# Patient Record
Sex: MALE | Race: ASIAN | NOT HISPANIC OR LATINO | Employment: UNEMPLOYED | URBAN - METROPOLITAN AREA
[De-identification: names, ages, dates, MRNs, and addresses within clinical notes are randomized per-mention and may not be internally consistent; named-entity substitution may affect disease eponyms.]

---

## 2017-01-04 ENCOUNTER — GENERIC CONVERSION - ENCOUNTER (OUTPATIENT)
Dept: OTHER | Facility: OTHER | Age: 47
End: 2017-01-04

## 2017-01-04 ENCOUNTER — TRANSCRIBE ORDERS (OUTPATIENT)
Dept: ADMINISTRATIVE | Facility: HOSPITAL | Age: 47
End: 2017-01-04

## 2017-01-04 DIAGNOSIS — M54.5 LOW BACK PAIN, UNSPECIFIED BACK PAIN LATERALITY, UNSPECIFIED CHRONICITY, WITH SCIATICA PRESENCE UNSPECIFIED: ICD-10-CM

## 2017-01-04 DIAGNOSIS — M54.16 LUMBAR RADICULOPATHY: Primary | ICD-10-CM

## 2017-01-04 DIAGNOSIS — G89.4 CHRONIC PAIN SYNDROME: ICD-10-CM

## 2017-01-04 DIAGNOSIS — M96.1 POSTLAMINECTOMY SYNDROME, UNSPECIFIED REGION: ICD-10-CM

## 2017-01-10 ENCOUNTER — HOSPITAL ENCOUNTER (OUTPATIENT)
Dept: RADIOLOGY | Facility: HOSPITAL | Age: 47
Discharge: HOME | End: 2017-01-10
Attending: ANESTHESIOLOGY
Payer: COMMERCIAL

## 2017-01-10 DIAGNOSIS — M54.5 LOW BACK PAIN, UNSPECIFIED BACK PAIN LATERALITY, UNSPECIFIED CHRONICITY, WITH SCIATICA PRESENCE UNSPECIFIED: ICD-10-CM

## 2017-01-10 DIAGNOSIS — G89.4 CHRONIC PAIN SYNDROME: ICD-10-CM

## 2017-01-10 DIAGNOSIS — M96.1 POSTLAMINECTOMY SYNDROME, UNSPECIFIED REGION: ICD-10-CM

## 2017-01-10 DIAGNOSIS — M54.16 LUMBAR RADICULOPATHY: ICD-10-CM

## 2017-01-10 PROCEDURE — A9585 GADOBUTROL INJECTION: HCPCS | Performed by: ANESTHESIOLOGY

## 2017-01-10 PROCEDURE — 72158 MRI LUMBAR SPINE W/O & W/DYE: CPT

## 2017-01-10 RX ADMIN — GADOBUTROL 13 ML: 604.72 INJECTION INTRAVENOUS at 14:47

## 2017-01-18 ENCOUNTER — GENERIC CONVERSION - ENCOUNTER (OUTPATIENT)
Dept: OTHER | Facility: OTHER | Age: 47
End: 2017-01-18

## 2017-01-30 ENCOUNTER — ALLSCRIPTS OFFICE VISIT (OUTPATIENT)
Dept: OTHER | Facility: OTHER | Age: 47
End: 2017-01-30

## 2017-02-16 ENCOUNTER — HOSPITAL ENCOUNTER (OUTPATIENT)
Facility: AMBULARY SURGERY CENTER | Age: 47
Setting detail: OUTPATIENT SURGERY
Discharge: HOME | End: 2017-02-16
Attending: ANESTHESIOLOGY | Admitting: ANESTHESIOLOGY
Payer: COMMERCIAL

## 2017-02-16 ENCOUNTER — HOSPITAL ENCOUNTER (OUTPATIENT)
Dept: RADIOLOGY | Facility: HOSPITAL | Age: 47
Setting detail: OUTPATIENT SURGERY
Discharge: HOME | End: 2017-02-16
Payer: COMMERCIAL

## 2017-02-16 VITALS
HEART RATE: 65 BPM | DIASTOLIC BLOOD PRESSURE: 77 MMHG | HEIGHT: 75 IN | BODY MASS INDEX: 35.56 KG/M2 | TEMPERATURE: 98.2 F | WEIGHT: 286 LBS | OXYGEN SATURATION: 97 % | SYSTOLIC BLOOD PRESSURE: 159 MMHG | RESPIRATION RATE: 18 BRPM

## 2017-02-16 PROBLEM — M54.50 LOW BACK PAIN: Status: ACTIVE | Noted: 2017-02-16

## 2017-02-16 PROBLEM — M96.1 POSTLAMINECTOMY SYNDROME, NOT ELSEWHERE CLASSIFIED: Status: ACTIVE | Noted: 2017-02-16

## 2017-02-16 PROBLEM — G89.4 CHRONIC PAIN SYNDROME: Status: ACTIVE | Noted: 2017-02-16

## 2017-02-16 PROBLEM — M54.16 RADICULOPATHY OF LUMBAR REGION: Status: ACTIVE | Noted: 2017-02-16

## 2017-02-16 PROCEDURE — 72220 X-RAY EXAM SACRUM TAILBONE: CPT

## 2017-02-16 RX ORDER — TOPIRAMATE 100 MG/1
100 TABLET, FILM COATED ORAL 2 TIMES DAILY
COMMUNITY

## 2017-02-16 RX ORDER — METHYLPREDNISOLONE ACETATE 80 MG/ML
INJECTION, SUSPENSION INTRA-ARTICULAR; INTRALESIONAL; INTRAMUSCULAR; SOFT TISSUE AS NEEDED
Status: DISCONTINUED | OUTPATIENT
Start: 2017-02-16 | End: 2017-02-16 | Stop reason: HOSPADM

## 2017-02-16 RX ORDER — BUTALBITAL/ASPIRIN/CAFFEINE 50-325-40
1 CAPSULE ORAL EVERY 4 HOURS PRN
COMMUNITY

## 2017-02-16 RX ORDER — AMITRIPTYLINE HYDROCHLORIDE 25 MG/1
25 TABLET, FILM COATED ORAL
COMMUNITY

## 2017-02-16 RX ORDER — TRAMADOL HYDROCHLORIDE 50 MG/1
50 TABLET ORAL EVERY 6 HOURS PRN
COMMUNITY

## 2017-02-16 RX ORDER — LIDOCAINE HYDROCHLORIDE 10 MG/ML
INJECTION, SOLUTION EPIDURAL; INFILTRATION; INTRACAUDAL; PERINEURAL AS NEEDED
Status: DISCONTINUED | OUTPATIENT
Start: 2017-02-16 | End: 2017-02-16 | Stop reason: HOSPADM

## 2017-03-03 ENCOUNTER — GENERIC CONVERSION - ENCOUNTER (OUTPATIENT)
Dept: OTHER | Facility: OTHER | Age: 47
End: 2017-03-03

## 2017-03-16 ENCOUNTER — ALLSCRIPTS OFFICE VISIT (OUTPATIENT)
Dept: OTHER | Facility: OTHER | Age: 47
End: 2017-03-16

## 2017-04-05 ENCOUNTER — TRANSCRIBE ORDERS (OUTPATIENT)
Dept: ADMINISTRATIVE | Facility: HOSPITAL | Age: 47
End: 2017-04-05

## 2017-04-05 DIAGNOSIS — M54.16 LUMBAR RADICULOPATHY: ICD-10-CM

## 2017-04-05 DIAGNOSIS — M54.5 LOW BACK PAIN, UNSPECIFIED BACK PAIN LATERALITY, UNSPECIFIED CHRONICITY, WITH SCIATICA PRESENCE UNSPECIFIED: ICD-10-CM

## 2017-04-05 DIAGNOSIS — M96.1 POSTLAMINECTOMY SYNDROME, THORACIC REGION: ICD-10-CM

## 2017-04-05 DIAGNOSIS — G89.4 CHRONIC PAIN SYNDROME: Primary | ICD-10-CM

## 2017-04-06 ENCOUNTER — HOSPITAL ENCOUNTER (OUTPATIENT)
Dept: RADIOLOGY | Facility: HOSPITAL | Age: 47
Discharge: HOME | End: 2017-04-06
Attending: ANESTHESIOLOGY
Payer: COMMERCIAL

## 2017-04-06 DIAGNOSIS — M54.16 LUMBAR RADICULOPATHY: ICD-10-CM

## 2017-04-06 DIAGNOSIS — M54.5 LOW BACK PAIN, UNSPECIFIED BACK PAIN LATERALITY, UNSPECIFIED CHRONICITY, WITH SCIATICA PRESENCE UNSPECIFIED: ICD-10-CM

## 2017-04-06 DIAGNOSIS — G89.4 CHRONIC PAIN SYNDROME: ICD-10-CM

## 2017-04-06 DIAGNOSIS — M96.1 POSTLAMINECTOMY SYNDROME, THORACIC REGION: ICD-10-CM

## 2017-04-06 PROCEDURE — 72146 MRI CHEST SPINE W/O DYE: CPT

## 2017-04-13 ENCOUNTER — ALLSCRIPTS OFFICE VISIT (OUTPATIENT)
Dept: OTHER | Facility: OTHER | Age: 47
End: 2017-04-13

## 2017-10-12 ENCOUNTER — GENERIC CONVERSION - ENCOUNTER (OUTPATIENT)
Dept: OTHER | Facility: OTHER | Age: 47
End: 2017-10-12

## 2018-01-10 NOTE — MISCELLANEOUS
Message   Recorded as Task   Date: 12/22/2016 10:57 AM, Created By: Marysol Hare   Task Name: Call Back   Assigned To: [address] [facility] Procedure,Team   Regarding Patient: Jessica Daniels, Status: Active   Comment:    Shavonne Bowen - 22 Dec 2016 10:57 AM     TASK CREATED  Caller: Self; Authorization Status; [phone] (Home)  Received a vmlom from 1018 today from the pt  stating his MRI was not approved because his insurance company needs supporting documentation  Could you please look into this for him? Thanks   Talib Ocampo - 30 Dec 2016 10:11 AM     TASK REASSIGNED: Previously Assigned To [facility] surgery sched,Team        Active Problems    1  Chronic pain syndrome (338 4) (G89 4)   2  Continuous opioid dependence (304 01) (F11 20)   3  Long-term current use of opiate analgesic (V58 69) (Z79 891)   4  Lumbago (724 2) (M54 5)   5  Lumbar postlaminectomy syndrome (722 83) (M96 1)   6  Lumbar radiculopathy (724 4) (M54 16)    Current Meds   1  Amitriptyline HCl - 25 MG Oral Tablet; Therapy: (Recorded:77Wdh9900) to Recorded   2  Butalbital-APAP-Caffeine -40 MG Oral Tablet; Therapy: (Recorded:01Juh4146) to Recorded   3  MetFORMIN HCl TABS; Therapy: (Recorded:92Yii9430) to Recorded   4  Topiramate 100 MG Oral Tablet; TAKE 1 TABLET TWICE DAILY; Therapy: 06ZCQ5635 to (Evaluate:28Jan2017)  Requested for: 08Ktc9274; Last   Rx:50Twl6715 Ordered   5  TraMADol HCl - 50 MG Oral Tablet; Take 1-2 tablets PO TID prn pain; Therapy: 84RTV2731 to (Evaluate:82Fvl1905); Last Rx:40Fkc1361 Ordered   6  Vitamin D TABS; Therapy: (Recorded:12Faq0649) to Recorded    Allergies    1   No Known Drug Allergies    Signatures   Electronically signed by : Tejinder Mccarthy MA; Jan 4 2017  3:01PM EST                       (Author)

## 2018-01-10 NOTE — RESULT NOTES
Message   Recorded as Task   Date: 02/23/2017 01:49 PM, Created By: Mary Davis   Task Name: Follow Up   Assigned To: [address] [facility], [address] Procedure,Team   Regarding Patient: Lisa Farris, Status: In Progress   Comment:    Violet England - 23 Feb 2017 1:49 PM     TASK CREATED  L/m to return call  S/p CAUDAL GRACE done 2/16/17 by Violte Amador - 28 Feb 2017 10:12 AM     TASK EDITED  2nd attempt  l/m to return call  S/p CAUDAL GRACE done 2/136/17 by Ginette Arce - 56 Mar 6364 22:23 AM     TASK EDITED  2 attempt made  Pt has f/u 3/16 will f/u then          Signatures   Electronically signed by : Victoria Sethi, ; Mar  3 2017 10:02AM EST                       (Author)

## 2018-01-11 NOTE — MISCELLANEOUS
Message   Recorded as Task   Date: 01/04/2017 12:51 PM, Created By: Zaid Porras   Task Name: Care Coordination   Assigned To: Violet England   Regarding Patient: Jermaine Posey, Status: Active   Comment:    Violet England - 04 Jan 2017 12:51 PM     TASK CREATED  Patient MRI was denied  Please call [phone] option# 3 to do a peer to peer  [address] - 04 Jan 2017 1:00 PM     TASK REPLIED TO: Previously Assigned To [address]  I need the reference number and patient ID number in order to do a peer to peer  [address] - 04 Jan 2017 2:45 PM     TASK REASSIGNED: Previously Assigned To Violet England  MRI approved - [id number]   Violet England - 04 Jan 2017 2:58 PM     TASK EDITED  Patient was notified that his MRI was approved and the number for scheduling was provided  The MRI order was faxed with the auth# on it to the scheduling dept  Active Problems    1  Chronic pain syndrome (338 4) (G89 4)   2  Continuous opioid dependence (304 01) (F11 20)   3  Long-term current use of opiate analgesic (V58 69) (Z79 891)   4  Lumbago (724 2) (M54 5)   5  Lumbar postlaminectomy syndrome (722 83) (M96 1)   6  Lumbar radiculopathy (724 4) (M54 16)    Current Meds   1  Amitriptyline HCl - 25 MG Oral Tablet; Therapy: (Recorded:43Klj3091) to Recorded   2  Butalbital-APAP-Caffeine -40 MG Oral Tablet; Therapy: (Recorded:13Wlt0901) to Recorded   3  MetFORMIN HCl TABS; Therapy: (Recorded:53Wev8021) to Recorded   4  Topiramate 100 MG Oral Tablet; TAKE 1 TABLET TWICE DAILY; Therapy: 92RVD4711 to (Evaluate:28Jan2017)  Requested for: 29Dec2016; Last   Rx:50Nmz3341 Ordered   5  TraMADol HCl - 50 MG Oral Tablet; Take 1-2 tablets PO TID prn pain; Therapy: 21SRA6046 to (Evaluate:08Qyy8764); Last Rx:18Sgz9363 Ordered   6  Vitamin D TABS; Therapy: (Recorded:99Sry2542) to Recorded    Allergies    1   No Known Drug Allergies    Signatures   Electronically signed by : Elvia Loza MA; Jan 4 2017  2:58PM EST (Author)

## 2018-01-13 VITALS
WEIGHT: 287 LBS | DIASTOLIC BLOOD PRESSURE: 74 MMHG | HEART RATE: 76 BPM | TEMPERATURE: 98.7 F | OXYGEN SATURATION: 97 % | SYSTOLIC BLOOD PRESSURE: 118 MMHG | HEIGHT: 75 IN | BODY MASS INDEX: 35.68 KG/M2

## 2018-01-14 VITALS
SYSTOLIC BLOOD PRESSURE: 130 MMHG | DIASTOLIC BLOOD PRESSURE: 80 MMHG | WEIGHT: 293 LBS | OXYGEN SATURATION: 97 % | HEIGHT: 75 IN | HEART RATE: 76 BPM | BODY MASS INDEX: 36.43 KG/M2

## 2018-01-15 NOTE — MISCELLANEOUS
Message   Recorded as Task   Date: 01/18/2017 01:11 PM, Created By: Shi Perez   Task Name: Follow Up   Assigned To: [address] [facility] [address] [facility] Clinical,Team   Regarding Patient: Chuyita Stewart, Status: Active   Comment:    Humaira Arce - 18 Jan 2017 1:11 PM     TASK CREATED  Caller: Self; General Medical Question; [phone] (Home)  Pt lmom inquiring if he could be put to sleep for injection on 2/2/17 because he knows it hurts bad and he is afraid of pain & needles  Returned pt's call and advised him that the doctor does not perform caudal charlee under general anesthesia and uses a local anesthetic  Pt verbalized understanding  Humaira Arce - 18 Jan 2017 1:11 PM     TASK EDITED   Bhavna Walker - 18 Jan 2017 1:52 PM     TASK REPLIED TO: Previously Assigned To [place]  Thanks  Active Problems    1  Chronic pain syndrome (338 4) (G89 4)   2  Continuous opioid dependence (304 01) (F11 20)   3  Long-term current use of opiate analgesic (V58 69) (Z79 891)   4  Lumbago (724 2) (M54 5)   5  Lumbar postlaminectomy syndrome (722 83) (M96 1)   6  Lumbar radiculopathy (724 4) (M54 16)    Current Meds   1  Amitriptyline HCl - 25 MG Oral Tablet; Therapy: (Recorded:63Lvy0447) to Recorded   2  Butalbital-APAP-Caffeine -40 MG Oral Tablet; Therapy: (Recorded:15Xfz2082) to Recorded   3  MetFORMIN HCl TABS; Therapy: (Recorded:78Coz3997) to Recorded   4  Topiramate 100 MG Oral Tablet; TAKE 1 TABLET TWICE DAILY; Therapy: 65VTL9765 to (Evaluate:28Jan2017)  Requested for: 47Iyd0809; Last   Rx:55Dbs1327 Ordered   5  TraMADol HCl - 50 MG Oral Tablet; Take 1-2 tablets PO TID prn pain; Therapy: 28LGH8781 to (Evaluate:95Ukt6714); Last Rx:73Veh1834 Ordered   6  Vitamin D TABS; Therapy: (Recorded:28Sjt9702) to Recorded    Allergies    1   No Known Drug Allergies    Signatures   Electronically signed by : Familia Huang RN; Jan 18 2017  1:55PM EST                       (Author)

## 2018-01-15 NOTE — MISCELLANEOUS
Message   Recorded as Task   Date: 01/11/2017 03:21 PM, Created By: [address]   Task Name: Care Coordination   Assigned To: [facility] stim,Team   Regarding Patient: Lisa Farris, Status: In Progress   Comment:    DennisTinoaz - 11 Jan 2017 3:21 PM     TASK CREATED  Discussed MRI results with patient  Also discussed SCS trial with [employer] technology  The patient is interested in learning more about the technology, so please mail him an informational packet from [address] [place] [address] as well as [facility] Martin - 11 Jan 2017 3:32 PM     TASK EDITED  Spoke with pt and informed him I will mail out [employer], pt is scheduled for education on 2/3/17 @ 1500 at the [place] office  Email sent to Nelia from [facility]  If pt is then still interested we will move fwd with psych eval    Loren Quiros - 22 Mar 2017 3:48 PM     TASK EDITED  Pt called regarding psych eval  Pls contact pt at [phone]  Cami Cadena - 23 Mar 2017 8:55 AM     TASK EDITED  Attempt to reach pt  LM informing him that I have not received his psych eval yet, left my number for him to return my call  Cami Cadena - 05 Apr 2017 9:38 AM     TASK EDITED  Spoke with pt and gave him the phone number for the [facility] in clinical services for the psych eval ([phone]) and also the number for [facility]'s central scheduling for the MRI  Cami Cadena - 18 Apr 2017 10:56 AM     TASK EDITED  Pt comeplted education   Kenzie Louis - 12 Oct 2017 11:43 AM     TASK EDITED  Spoke with pt and he is dealing with kidney issues but should things change and he would like to move forward with the SCS trial he will call  Active Problems    1  Chronic pain syndrome (338 4) (G89 4)   2  Continuous opioid dependence (304 01) (F11 20)   3  Long-term current use of opiate analgesic (V58 69) (Z79 891)   4  Lumbago (724 2) (M54 5)   5  Lumbar postlaminectomy syndrome (722 83) (M96 1)   6   Lumbar radiculopathy (724 4) (M54 16)    Current Meds   1  Amitriptyline HCl - 25 MG Oral Tablet; Therapy: (Recorded:45Xxl9001) to Recorded   2  Butalbital-APAP-Caffeine -40 MG Oral Tablet; Therapy: (Recorded:03Llc7808) to Recorded   3  MetFORMIN HCl TABS; Therapy: (Recorded:56Mth2564) to Recorded   4  Topiramate 100 MG Oral Tablet; TAKE 1 TABLET TWICE DAILY; Therapy: 20IGG8502 to (Evaluate:15Apr2017)  Requested for: 29XFQ3825; Last   Rx:16Mar2017 Ordered   5  TraMADol HCl - 50 MG Oral Tablet; Take 1-2 tablets PO TID prn pain; Therapy: 43ZKL2973 to (Evaluate:05Mar2017); Last Rx:30Jan2017 Ordered   6  Vitamin D TABS; Therapy: (Recorded:88Tfl1720) to Recorded    Allergies    1   No Known Drug Allergies    Signatures   Electronically signed by : Diana Mast, ; Oct 12 2017 11:44AM EST                       (Author)

## 2018-01-15 NOTE — MISCELLANEOUS
Signatures   Electronically signed by : JENNIE Erickson ; Apr 13 2017 11:08AM EST                       (Author)

## (undated) DEVICE — SYRINGE 5ML LL

## (undated) DEVICE — NEEDLE SPINAL 22G X 3.5IN  QUINCKE

## (undated) DEVICE — SMALL NEEDLE COUNTER NEST

## (undated) DEVICE — UNIVERSAL BLOCK TRAY: Brand: INTEGRA®

## (undated) DEVICE — RADIOLOGY STERILE LABELS: Brand: CENTURION

## (undated) DEVICE — CHLORAPREP HI-LITE 10.5ML ORANGE

## (undated) DEVICE — NEEDLE BLUNT 18 G X 1 1/2 W FILTER

## (undated) DEVICE — WIPES BABY PAMPERS SENSITIVE 36/PK

## (undated) DEVICE — GLOVE SRG BIOGEL 7.5

## (undated) DEVICE — SYRINGE 20ML LL

## (undated) DEVICE — TOWEL SET X-RAY